# Patient Record
(demographics unavailable — no encounter records)

---

## 2024-10-15 NOTE — HISTORY OF PRESENT ILLNESS
[Breast milk] : breast milk [Cow's milk ___ oz/feed] : [unfilled] oz of Cow's milk per feed [Normal] : Normal [On back] : On back [In crib] : In crib [Sippy cup use] : Sippy cup use [Toothpaste] : Primary Fluoride Source: Toothpaste [Playtime] : Playtime  [No] : Not at  exposure [Up to date] : Up to date [FreeTextEntry7] : developed rash w/ peanuts which self resolved. has appt w/ allergist [de-identified] : eating varied diet

## 2024-10-15 NOTE — PHYSICAL EXAM
[Alert] : alert [Normocephalic] : normocephalic [Closed Anterior [place]] : closed anterior fontanelle [Red Reflex] : red reflex bilateral [PERRL] : PERRL [Normally Placed Ears] : normally placed ears [Auricles Well Formed] : auricles well formed [Clear Tympanic membranes] : clear tympanic membranes [Light reflex present] : light reflex present [Bony landmarks visible] : bony landmarks visible [Discharge] : no discharge [Nares Patent] : nares patent [Palate Intact] : palate intact [Uvula Midline] : uvula midline [Tooth Eruption] : tooth eruption [Supple, full passive range of motion] : supple, full passive range of motion [Palpable Masses] : no palpable masses [Symmetric Chest Rise] : symmetric chest rise [Clear to Auscultation Bilaterally] : clear to auscultation bilaterally [Regular Rate and Rhythm] : regular rate and rhythm [S1, S2 present] : S1, S2 present [Murmurs] : no murmurs [+2 Femoral Pulses] : (+) 2 femoral pulses [Soft] : soft [Tender] : nontender [Distended] : nondistended [Bowel Sounds] : normoactive bowel sounds [Hepatomegaly] : no hepatomegaly [Splenomegaly] : no splenomegaly [Normal External Genitalia] : normal external genitalia [Clitoromegaly] : no clitoromegaly [Normal Vaginal Introitus] : normal vaginal introitus [No Abnormal Lymph Nodes Palpated] : no abnormal lymph nodes palpated [Symmetric Abduction and Rotation of Hips] : symmetric abduction and rotation of hips [Allis Sign] : negative Allis sign [Straight] : straight [Cranial Nerves Grossly Intact] : cranial nerves grossly intact [Rash or Lesions] : no rash/lesions

## 2024-10-15 NOTE — DISCUSSION/SUMMARY
[Family Support] : family support [Establishing Routines] : establishing routines [Feeding and Appetite Changes] : feeding and appetite changes [Establishing A Dental Home] : establishing a dental home [Safety] : safety [FreeTextEntry1] : Transition to whole cow's milk. Continue table foods, 3 meals with 2-3 snacks per day. Incorporate up to 6 oz of flourinated water daily in a sippy cup. Brush teeth twice a day with soft toothbrush. Recommend visit to dentist. When in car, keep child in rear-facing car seats until age 2, or until  the maximum height and weight for seat is reached. Put baby to sleep in own crib with no loose or soft bedding. Lower crib matress. Help baby to maintain consistent daily routines and sleep schedule. Recognize stranger and separation anxiety. Ensure home is safe since baby is increasingly mobile. Be within arm's reach of baby at all times. Use consistent, positive discipline. Avoid screen time. Read aloud to baby. Add on peanut allergy testing to routine bloodwork, has appt w/ allergist  MMR and flu#1 given, return in 1 month for varicella and flu #2

## 2024-11-04 NOTE — HISTORY OF PRESENT ILLNESS
[Asthma] : asthma [Allergic Rhinitis] : allergic rhinitis [Eczematous rashes] : eczematous rashes [de-identified] : 12 months old female with no pmhx presenting for evaluation of peanut allergy. In  august 2024 (08/10/2024), patient was eating about a teaspoon of peanuts smeared on sliced bananas. Mother was not looking, patient ate multiple slices of peanut covered bananas and noted that she became red and itchy around the mouth.. Mother toko her to [facility] ED. Seen at [facility] ED 2.5 hours after ingestions and the rash had fully self resolved. She was discharged home with epi pen.  She has has peanut oil cooked when eating outside before and since then. no other direct exposure to peanuts since then.  She eats other nuts except for cashews. She has had exposure to other allergens (sesame, wheat, milk, soy, fish, treenuts) with no allergic complications.  She has had similar reaction to eggs at 6 months of age but it self-resolved and she now eats eggs with no further complications. No atopic history. No wheezing history. No recent hospitalizations Oldest sister has tree nut allergy (walnuts, hazelnut, pecans). No fhx of eczema, and asthma.  Missing her varicella vaccine- plan to get it with the second dose of the flu  Otherwise vUTD  She uses aveeno eczema oatmeal body lotion and aveeno baby/ cetaphil baby shower gel.  Detergent switched between regular tide and tide free and clear.   Lives at home with three siblings and father.  No pets and no one smokes.

## 2024-11-04 NOTE — CONSULT LETTER
[Dear  ___] : Dear  [unfilled], [Consult Letter:] : I had the pleasure of evaluating your patient, [unfilled]. [Please see my note below.] : Please see my note below. [Consult Closing:] : Thank you very much for allowing me to participate in the care of this patient.  If you have any questions, please do not hesitate to contact me. [Sincerely,] : Sincerely, [FreeTextEntry2] : Dr. Kailash Coats

## 2024-11-04 NOTE — BIRTH HISTORY
[Normal Vaginal Route] : by normal vaginal route [Age Appropriate] : Age appropriate developmental milestones met [NICU Stay] : Patient did not stay in NICU

## 2024-11-04 NOTE — HISTORY OF PRESENT ILLNESS
[Asthma] : asthma [Allergic Rhinitis] : allergic rhinitis [Eczematous rashes] : eczematous rashes [de-identified] : 12 months old female with no pmhx presenting for evaluation of peanut allergy. In  august 2024 (08/10/2024), patient was eating about a teaspoon of peanuts smeared on sliced bananas. Mother was not looking, patient ate multiple slices of peanut covered bananas and noted that she became red and itchy around the mouth.. Mother toko her to [facility] ED. Seen at [facility] ED 2.5 hours after ingestions and the rash had fully self resolved. She was discharged home with epi pen.  She has has peanut oil cooked when eating outside before and since then. no other direct exposure to peanuts since then.  She eats other nuts except for cashews. She has had exposure to other allergens (sesame, wheat, milk, soy, fish, treenuts) with no allergic complications.  She has had similar reaction to eggs at 6 months of age but it self-resolved and she now eats eggs with no further complications. No atopic history. No wheezing history. No recent hospitalizations Oldest sister has tree nut allergy (walnuts, hazelnut, pecans). No fhx of eczema, and asthma.  Missing her varicella vaccine- plan to get it with the second dose of the flu  Otherwise vUTD  She uses aveeno eczema oatmeal body lotion and aveeno baby/ cetaphil baby shower gel.  Detergent switched between regular tide and tide free and clear.   Lives at home with three siblings and father.  No pets and no one smokes.

## 2025-01-21 NOTE — DEVELOPMENTAL MILESTONES
[Normal Development] : Normal Development [None] : none [Imitates scribbling] : imitates scribbling [Drinks from cup with little] : drinks from cup with little spilling [Points to ask for something] : points to ask for something or to get help [Uses 3 words other than names] : uses 3 words other than names [Speaks in sounds that seem like] : speaks in sounds that seem like an unknown language [Follows directions that do not] : follows direction that do not include a gesture [Looks when parent says,] : looks when parent says, "Where is...?" [Squats to  objects] : squats to  objects [Crawls up a few steps] : crawls up a few steps [Begins to run] : begins to run [Makes debbie with crayon] : makes debbie with danielyon [Drops object into and takes object] : drops object into and takes object out of container

## 2025-01-22 NOTE — HISTORY OF PRESENT ILLNESS
[Mother] : mother [Cow's milk (Ounces per day ___)] : consumes [unfilled] oz of cow's milk per day [Fruit] : fruit [Vegetables] : vegetables [Meat] : meat [Cereal] : cereal [Eggs] : eggs [Baby food] : baby food [Finger Foods] : finger foods [Table food] : table food [Normal] : Normal [In crib] : In crib [Brushing teeth] : Brushing teeth [Vitamin] : Primary Fluoride Source: Vitamin [Playtime] : Playtime [No] : No cigarette smoke exposure [Car seat in back seat] : Car seat in back seat [Carbon Monoxide Detectors] : Carbon monoxide detectors [Smoke Detectors] : Smoke detectors [Up to date] : Up to date [NO] : No [FreeTextEntry7] : Eats peanut butter now with no issues, saw A&I who recommended slow re-introduction of peanuts into diet.  [de-identified] : Breastfeeds and drinks a cup of milk every other day. Eats yogurt and cheese  [de-identified] : Not consistently taking vitamin

## 2025-01-22 NOTE — PHYSICAL EXAM
[Alert] : alert [No Acute Distress] : no acute distress [Normocephalic] : normocephalic [Anterior [place] Closed] : anterior fontanelle closed [Red Reflex Bilateral] : red reflex bilateral [PERRL] : PERRL [Normally Placed Ears] : normally placed ears [Auricles Well Formed] : auricles well formed [Clear Tympanic membranes with present light reflex and bony landmarks] : clear tympanic membranes with present light reflex and bony landmarks [No Discharge] : no discharge [Nares Patent] : nares patent [Palate Intact] : palate intact [Uvula Midline] : uvula midline [Tooth Eruption] : tooth eruption  [Supple, full passive range of motion] : supple, full passive range of motion [No Palpable Masses] : no palpable masses [Symmetric Chest Rise] : symmetric chest rise [Clear to Auscultation Bilaterally] : clear to auscultation bilaterally [Regular Rate and Rhythm] : regular rate and rhythm [S1, S2 present] : S1, S2 present [No Murmurs] : no murmurs [+2 Femoral Pulses] : +2 femoral pulses [Soft] : soft [NonTender] : non tender [Non Distended] : non distended [Normoactive Bowel Sounds] : normoactive bowel sounds [No Hepatomegaly] : no hepatomegaly [No Splenomegaly] : no splenomegaly [Jeronimo 1] : Jeronimo 1 [Negative Bailey-Ortalani] : negative Bailey-Ortalani [Symmetric Buttocks Creases] : symmetric buttocks creases [Cranial Nerves Grossly Intact] : cranial nerves grossly intact [de-identified] : congenital melanocytic nevus x3: left forearm, 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

## 2025-01-22 NOTE — HISTORY OF PRESENT ILLNESS
[Mother] : mother [Cow's milk (Ounces per day ___)] : consumes [unfilled] oz of cow's milk per day [Fruit] : fruit [Vegetables] : vegetables [Meat] : meat [Cereal] : cereal [Eggs] : eggs [Baby food] : baby food [Finger Foods] : finger foods [Table food] : table food [Normal] : Normal [In crib] : In crib [Brushing teeth] : Brushing teeth [Vitamin] : Primary Fluoride Source: Vitamin [Playtime] : Playtime [No] : No cigarette smoke exposure [Car seat in back seat] : Car seat in back seat [Carbon Monoxide Detectors] : Carbon monoxide detectors [Smoke Detectors] : Smoke detectors [Up to date] : Up to date [NO] : No [FreeTextEntry7] : Eats peanut butter now with no issues, saw A&I who recommended slow re-introduction of peanuts into diet.  [de-identified] : Breastfeeds and drinks a cup of milk every other day. Eats yogurt and cheese  [de-identified] : Not consistently taking vitamin

## 2025-01-22 NOTE — DISCUSSION/SUMMARY
[Normal Growth] : growth [Normal Development] : development [None] : No known medical problems [No Elimination Concerns] : elimination [No Feeding Concerns] : feeding [No Skin Concerns] : skin [Normal Sleep Pattern] : sleep [No Medications] : ~He/She~ is not on any medications [Parent/Guardian] : parent/guardian [] : The components of the vaccine(s) to be administered today are listed in the plan of care. The disease(s) for which the vaccine(s) are intended to prevent and the risks have been discussed with the caretaker.  The risks are also included in the appropriate vaccination information statements which have been provided to the patient's caregiver.  The caregiver has given consent to vaccinate. [FreeTextEntry1] : 15mo ex-FT F here for 15mo [facility]. No acute concerns regarding growth and development.   Continue whole cow's milk. Continue table foods, 3 meals with 2-3 snacks per day. Incorporate flourinated water daily in a sippy cup. Brush teeth twice a day with soft toothbrush. Recommend visit to dentist. When in car, keep child in rear-facing car seats until age 2, or until  the maximum height and weight for seat is reached. Put baby to sleep in own crib. Lower crib matress. Help baby to maintain consistent daily routines and sleep schedule. Recognize stranger and separation anxiety. Ensure home is safe since baby is increasingly mobile. Be within arm's reach of baby at all times. Use consistent, positive discipline. Read aloud to baby.  #Health Maintenance - Vaccines given today: Hep A #1, PCV #4 - Encouraged daily fluoride vitamin use and routine dental visit  - Brush teeth twice daily  - Return in 3 mo for 18 mo well child check.

## 2025-01-22 NOTE — PHYSICAL EXAM
[Alert] : alert [No Acute Distress] : no acute distress [Normocephalic] : normocephalic [Anterior [place] Closed] : anterior fontanelle closed [Red Reflex Bilateral] : red reflex bilateral [PERRL] : PERRL [Normally Placed Ears] : normally placed ears [Auricles Well Formed] : auricles well formed [Clear Tympanic membranes with present light reflex and bony landmarks] : clear tympanic membranes with present light reflex and bony landmarks [No Discharge] : no discharge [Nares Patent] : nares patent [Palate Intact] : palate intact [Uvula Midline] : uvula midline [Tooth Eruption] : tooth eruption  [Supple, full passive range of motion] : supple, full passive range of motion [No Palpable Masses] : no palpable masses [Symmetric Chest Rise] : symmetric chest rise [Clear to Auscultation Bilaterally] : clear to auscultation bilaterally [Regular Rate and Rhythm] : regular rate and rhythm [S1, S2 present] : S1, S2 present [No Murmurs] : no murmurs [+2 Femoral Pulses] : +2 femoral pulses [Soft] : soft [NonTender] : non tender [Non Distended] : non distended [Normoactive Bowel Sounds] : normoactive bowel sounds [No Hepatomegaly] : no hepatomegaly [No Splenomegaly] : no splenomegaly [Jeronimo 1] : Jeronimo 1 [Negative Bailey-Ortalani] : negative Bailey-Ortalani [Symmetric Buttocks Creases] : symmetric buttocks creases [Cranial Nerves Grossly Intact] : cranial nerves grossly intact [de-identified] : congenital melanocytic nevus x3: left forearm, 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

## 2025-01-22 NOTE — DISCUSSION/SUMMARY
[Normal Growth] : growth [Normal Development] : development [None] : No known medical problems [No Elimination Concerns] : elimination [No Feeding Concerns] : feeding [No Skin Concerns] : skin [Normal Sleep Pattern] : sleep [No Medications] : ~He/She~ is not on any medications [Parent/Guardian] : parent/guardian [] : The components of the vaccine(s) to be administered today are listed in the plan of care. The disease(s) for which the vaccine(s) are intended to prevent and the risks have been discussed with the caretaker.  The risks are also included in the appropriate vaccination information statements which have been provided to the patient's caregiver.  The caregiver has given consent to vaccinate. [FreeTextEntry1] : 15mo ex-FT F here for 15mo [facility]. No acute concerns regarding growth and development.   Continue whole cow's milk. Continue table foods, 3 meals with 2-3 snacks per day. Incorporate flourinated water daily in a sippy cup. Brush teeth twice a day with soft toothbrush. Recommend visit to dentist. When in car, keep child in rear-facing car seats until age 2, or until  the maximum height and weight for seat is reached. Put baby to sleep in own crib. Lower crib matress. Help baby to maintain consistent daily routines and sleep schedule. Recognize stranger and separation anxiety. Ensure home is safe since baby is increasingly mobile. Be within arm's reach of baby at all times. Use consistent, positive discipline. Read aloud to baby.  #Health Maintenance - Vaccines given today: Hep A #1, PCV #4 - Encouraged daily fluoride vitamin use and routine dental visit  - Brush teeth twice daily  - Return in 3 mo for 18 mo well child check.

## 2025-04-23 NOTE — HISTORY OF PRESENT ILLNESS
[Mother] : mother [Cow's milk (Ounces per day ___)] : consumes [unfilled] oz of Cow's milk per day [Fruit] : fruit [Vegetables] : vegetables [Meat] : meat [Cereal] : cereal [Eggs] : eggs [Baby food] : baby food [Finger Foods] : finger foods [Table food] : table food [Normal] : Normal [Sippy cup use] : Sippy cup use [Brushing teeth] : Brushing teeth [No] : Patient does not go to dentist yearly [Vitamin] : Primary Fluoride Source: Vitamin [Playtime] : Playtime  [Yes] : Cigarette smoke exposure [Car seat in back seat] : Car seat in back seat [Carbon Monoxide Detectors] : Carbon monoxide detectors [Smoke Detectors] : Smoke detectors [Up to date] : Up to date [NO] : No [FreeTextEntry7] : Today in office, was running in room, tripped, fell and hit forehead on chair. Cried, consolable. No recent illnesses or fevers. Went with sister to sister's ophtho appointment. While there, eye dr told mom she may have an astigmatism. Older sibling has astigmatism and started wearing glasses at age 4. Previous Gocheck here negative.  [de-identified] : Due for 18-month vaccines  [FreeTextEntry1] : 18 MONTH WELL VISIT

## 2025-04-23 NOTE — HISTORY OF PRESENT ILLNESS
[Mother] : mother [Cow's milk (Ounces per day ___)] : consumes [unfilled] oz of Cow's milk per day [Fruit] : fruit [Vegetables] : vegetables [Meat] : meat [Cereal] : cereal [Eggs] : eggs [Baby food] : baby food [Finger Foods] : finger foods [Table food] : table food [Normal] : Normal [Sippy cup use] : Sippy cup use [Brushing teeth] : Brushing teeth [No] : Patient does not go to dentist yearly [Vitamin] : Primary Fluoride Source: Vitamin [Playtime] : Playtime  [Yes] : Cigarette smoke exposure [Car seat in back seat] : Car seat in back seat [Carbon Monoxide Detectors] : Carbon monoxide detectors [Smoke Detectors] : Smoke detectors [Up to date] : Up to date [NO] : No [FreeTextEntry7] : Today in office, was running in room, tripped, fell and hit forehead on chair. Cried, consolable. No recent illnesses or fevers. Went with sister to sister's ophtho appointment. While there, eye dr told mom she may have an astigmatism. Older sibling has astigmatism and started wearing glasses at age 4. Previous Gocheck here negative.  [de-identified] : Due for 18-month vaccines  [FreeTextEntry1] : 18 MONTH WELL VISIT

## 2025-04-23 NOTE — PHYSICAL EXAM
[Alert] : alert [No Acute Distress] : no acute distress [Normocephalic] : normocephalic [Anterior [place] Closed] : anterior fontanelle closed [Red Reflex Bilateral] : red reflex bilateral [PERRL] : PERRL [Normally Placed Ears] : normally placed ears [Auricles Well Formed] : auricles well formed [Clear Tympanic membranes with present light reflex and bony landmarks] : clear tympanic membranes with present light reflex and bony landmarks [No Discharge] : no discharge [Nares Patent] : nares patent [Palate Intact] : palate intact [Uvula Midline] : uvula midline [Tooth Eruption] : tooth eruption  [Supple, full passive range of motion] : supple, full passive range of motion [No Palpable Masses] : no palpable masses [Symmetric Chest Rise] : symmetric chest rise [Clear to Auscultation Bilaterally] : clear to auscultation bilaterally [Regular Rate and Rhythm] : regular rate and rhythm [S1, S2 present] : S1, S2 present [No Murmurs] : no murmurs [Soft] : soft [NonTender] : non tender [Non Distended] : non distended [Normoactive Bowel Sounds] : normoactive bowel sounds [No Hepatomegaly] : no hepatomegaly [No Splenomegaly] : no splenomegaly [Jeronimo 1] : Jeronimo 1 [Symmetric Buttocks Creases] : symmetric buttocks creases [Cranial Nerves Grossly Intact] : cranial nerves grossly intact [FreeTextEntry2] : small abrasion on forehead, no bleeding, nontender  [de-identified] : congenital melanocytic nevus on left forearm, as seen on prior exams

## 2025-04-23 NOTE — PHYSICAL EXAM
[Alert] : alert [No Acute Distress] : no acute distress [Normocephalic] : normocephalic [Anterior [place] Closed] : anterior fontanelle closed [Red Reflex Bilateral] : red reflex bilateral [PERRL] : PERRL [Normally Placed Ears] : normally placed ears [Auricles Well Formed] : auricles well formed [Clear Tympanic membranes with present light reflex and bony landmarks] : clear tympanic membranes with present light reflex and bony landmarks [No Discharge] : no discharge [Nares Patent] : nares patent [Palate Intact] : palate intact [Uvula Midline] : uvula midline [Tooth Eruption] : tooth eruption  [Supple, full passive range of motion] : supple, full passive range of motion [No Palpable Masses] : no palpable masses [Symmetric Chest Rise] : symmetric chest rise [Clear to Auscultation Bilaterally] : clear to auscultation bilaterally [Regular Rate and Rhythm] : regular rate and rhythm [S1, S2 present] : S1, S2 present [No Murmurs] : no murmurs [Soft] : soft [NonTender] : non tender [Non Distended] : non distended [Normoactive Bowel Sounds] : normoactive bowel sounds [No Hepatomegaly] : no hepatomegaly [No Splenomegaly] : no splenomegaly [Jeronimo 1] : Jeronimo 1 [Symmetric Buttocks Creases] : symmetric buttocks creases [Cranial Nerves Grossly Intact] : cranial nerves grossly intact [FreeTextEntry2] : small abrasion on forehead, no bleeding, nontender  [de-identified] : congenital melanocytic nevus on left forearm, as seen on prior exams

## 2025-04-23 NOTE — DISCUSSION/SUMMARY
[Family Support] : family support [Child Development and Behavior] : child development and behavior [Language Promotion/Hearing] : language promotion/hearing [[place] Training Readiness] : toliet training readiness [Safety] : safety [No Medications] : ~He/She~ is not on any medications [Mother] : mother [] : The components of the vaccine(s) to be administered today are listed in the plan of care. The disease(s) for which the vaccine(s) are intended to prevent and the risks have been discussed with the caretaker.  The risks are also included in the appropriate vaccination information statements which have been provided to the patient's caregiver.  The caregiver has given consent to vaccinate. [FreeTextEntry1] : 18mo F here for 18mo WCC. VS reviewed. Gaining weight well. No acute concerns for growth and development. Physical exam with no concerning findings. While at older sister's ophtho appt, eye dr told mom she may have an astigmatism after brief exam. Prior Gocheck negative at 2yo, will recheck at 3yo WCC.   Continue whole cow's milk. Continue table foods, 3 meals with 2-3 snacks per day. Incorporate fluorinated water daily in a sippy cup. Brush teeth twice a day with soft toothbrush. Recommend visit to dentist. When in car, keep child in rear-facing car seats until age 2, or until  the maximum height and weight for seat is reached. Put toddler to sleep in own bed or crib. Help toddler to maintain consistent daily routines and sleep schedule. Toilet training discussed. Recognize anxiety in new settings. Ensure home is safe. Be within arm's reach of toddler at all times. Use consistent, positive discipline. Read aloud to toddler.  #Ophtho  - Gocheck at 1y WCC negative  - Will reassess with repeat Gocheck at 3yo WCC   #Health Maintenance  - Continue offering variety of solid foods  - Vaccines given today: DTap #4  - RTC in 6 months for 3 yo WCC or sooner PRN

## 2025-06-12 NOTE — DISCUSSION/SUMMARY
[FreeTextEntry1] : Left AOM - Complete 10 days of antibiotic. Provide ibuprofen as needed for pain or fever. If no improvement within 48 hours return for re-evaluation. Recheck in 2 weeks.  Diaper Rash - Apply Mupirocin 2% BID.  Recommend zinc oxide with every diaper change. Use water and a soft washcloth or baby wipes that are alcohol and fragrance-free. If the rash is severe, use a squirt bottle of water to clean the area, as doing so is gentler to the skin. Next, allow the area to air dry. Let your child go diaper-free as long as possible to let the skin dry and heal.  Discussed signs and symptoms that would required immediate care. Mother expressed understanding. All questions answered. Caretaker understands and agrees with plan. If (+) new or worsening symptoms or (+) parental concern - return to office

## 2025-06-12 NOTE — HISTORY OF PRESENT ILLNESS
[de-identified] : itchy diaper rash 10 days  [FreeTextEntry6] : Diaper rash for 10 days.  Nasal congestion for 1 week.  Tolerating po intake. normal uop.

## 2025-06-12 NOTE — PHYSICAL EXAM
[Bulging] : bulging [Purulent Effusion] : purulent effusion [Erythema] : erythema [Clear Effusion] : clear effusion [NL] : normotonic [de-identified] : +erythema around diaper area